# Patient Record
Sex: FEMALE | Race: WHITE | NOT HISPANIC OR LATINO | URBAN - METROPOLITAN AREA
[De-identification: names, ages, dates, MRNs, and addresses within clinical notes are randomized per-mention and may not be internally consistent; named-entity substitution may affect disease eponyms.]

---

## 2020-03-12 ENCOUNTER — EMERGENCY (EMERGENCY)
Facility: HOSPITAL | Age: 30
LOS: 1 days | Discharge: HOME | End: 2020-03-12
Attending: EMERGENCY MEDICINE
Payer: COMMERCIAL

## 2020-03-12 VITALS
OXYGEN SATURATION: 100 % | RESPIRATION RATE: 18 BRPM | DIASTOLIC BLOOD PRESSURE: 75 MMHG | SYSTOLIC BLOOD PRESSURE: 116 MMHG | HEART RATE: 80 BPM | TEMPERATURE: 101 F

## 2020-03-12 LAB
FLUBV RNA SPEC QL NAA+PROBE: DETECTED
RAPID RVP RESULT: DETECTED

## 2020-03-12 PROCEDURE — 87581 M.PNEUMON DNA AMP PROBE: CPT

## 2020-03-12 PROCEDURE — 99283 EMERGENCY DEPT VISIT LOW MDM: CPT

## 2020-03-12 PROCEDURE — 87798 DETECT AGENT NOS DNA AMP: CPT

## 2020-03-12 PROCEDURE — U0001: CPT

## 2020-03-12 PROCEDURE — 87486 CHLMYD PNEUM DNA AMP PROBE: CPT

## 2020-03-12 PROCEDURE — 87633 RESP VIRUS 12-25 TARGETS: CPT

## 2020-03-12 RX ORDER — ACETAMINOPHEN 500 MG
975 TABLET ORAL ONCE
Refills: 0 | Status: COMPLETED | OUTPATIENT
Start: 2020-03-12 | End: 2020-03-12

## 2020-03-12 RX ADMIN — Medication 975 MILLIGRAM(S): at 14:54

## 2020-03-12 NOTE — ED PROVIDER NOTE - OBJECTIVE STATEMENT
29y f pmhx craniofacial dysplasia p/w 3 days of fever and cough. Pt and her  returned from [place] tuesday evening when her symptoms began. She reports tmax 103F PO 2 days ago and a nonproductive cough. She also admits to sore throat and runny nose. her  has the same symptoms. She has not taken anything OTC for relief. came to the ED concerned about COVID19. Denies known contacts with people diagnosed with COVID19 but admits people were coughing around her. She did get her flu shot year year. 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 Denies CP, SOB, HA, neck pain, abd pain, n/v/d/c, abd pain, LOC, dizziness, or urinary symptoms. 29y f pmhx craniofacial dysplasia p/w 3 days of fever and cough. Pt and her  returned from [place] tuesday evening when her symptoms began. She reports tmax 103F PO 2 days ago and a nonproductive cough. She also admits to sore throat and runny nose. her  has the same symptoms. She has not taken anything OTC for relief. came to the ED concerned about COVID19. Denies known contacts with people diagnosed with COVID19 but admits people were coughing around her. She did get her flu shot year year. 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 Denies CP, SOB, HA, neck pain, abd pain, n/v/d/c, abd pain, LOC, dizziness, or urinary symptoms.      Attending note. Patient was seen in fast track room #4. Agree with the above. Patient recently traveled to [place] and developed fever subsequently. She has a mild cough. She denies any chest discomfort or dyspnea on exertion, or shortness of breath.

## 2020-03-12 NOTE — ED PROVIDER NOTE - PATIENT PORTAL LINK FT
You can access the FollowMyHealth Patient Portal offered by [facility] by registering at the following website: [email or web address]. By joining [employer]’s FollowMyHealth portal, you will also be able to view your health information using other applications (apps) compatible with our system.

## 2020-03-12 NOTE — ED PROVIDER NOTE - PROGRESS NOTE DETAILS
pt would like to go home, will contact her when RVP results. will advise self quarantine - Varinder Tovar PA-C pt would like to go home, will contact her when RVP results. will advise her and  to self quarantine. Case documented in [employer]. Attempted to call number in COVID order, no answer. - Varinder Tovar PA-C

## 2020-03-12 NOTE — ED PROVIDER NOTE - CLINICAL SUMMARY MEDICAL DECISION MAKING FREE TEXT BOX
Attn - pt with fever and cough after traveling to [place].  Although not on CDC list of countries, concern for COVID-19.  RVP, swab for coronavirus.

## 2020-03-12 NOTE — ED PROVIDER NOTE - NSFOLLOWUPINSTRUCTIONS_ED_ALL_ED_FT
YOU HAVE BEEN TESTED FOR COVID-19. YOU MUST REMAIN ISOLATED AT HOME FOR 14 DAYS MINIMUM UNTIL TOLD OTHERWISE TO PREVENT FURTHER SPREAD OF THIS DISEASE. YOU WILL RECEIVE A CALL FOR BOTH NEGATIVE AND POSITIVE RESULTS. PLEASE READ ATTACHED MATERIAL.    For fever/body aches:  Take Motrin 400-600mg every 6 hrs as needed for pain. Take with food  Take Tylenol 650mg every 6 hrs as needed for pain.    For cough:  Mucinex 600mg twice daily with lots of water    Stay well hydrated with water and electrolyte replacement solutions such as Pedialyte or the adult equivalent.     Return to the ED for worsening symptoms, shortness of breath, chest pain, inability to tolerate food/drink, loss of consciousness, or any other serious concerns.

## 2020-03-12 NOTE — ED ADULT NURSE NOTE - OBJECTIVE STATEMENT
29y female with no pmh presents to the ER c/o fever after recent travel to [place]. pt is alert and oriented x 4 and speaking coherently. pt states she returned from [place] on Tuesday and has had symptoms including fever tmax 103, diarrhea and non productive cough. pt states her  has similar symptoms, but his symptoms are improving. Pt denies cp, sob. pt in nad. pt ambulatory, gait steady. will reassess.

## 2020-03-12 NOTE — ED PROVIDER NOTE - PHYSICAL EXAMINATION
Attending note. Patient is alert and in no acute distress. Pharynx is slightly erythematous. There is no tonsillar exudates. There is no cervical adenopathy. Lungs are clear and equal bilaterally. Heart is regular rate and rhythm without murmur. Abdomen is nontender. There is no CVA tenderness. Skin is warm and dry.

## 2020-03-13 NOTE — ED POST DISCHARGE NOTE - DETAILS
3/13/20: Pt contacted, made aware of influenza B result and advised on supportive measures. Informed her COVID-19 testing not back yet and while co-infection would be unlikely, still recommended she stay on self-quarantine as advised until that results. Pt acknowledged understanding. - Shashank Duran PA-C 3/15/2020 @ 0826, patient called requesting covid results, informed patient that it is negative.

## 2020-03-14 LAB — SARS-COV-2 RNA SPEC QL NAA+PROBE: SIGNIFICANT CHANGE UP

## 2023-02-28 PROBLEM — Z78.9 OTHER SPECIFIED HEALTH STATUS: Chronic | Status: ACTIVE | Noted: 2020-03-12

## 2023-03-10 ENCOUNTER — APPOINTMENT (OUTPATIENT)
Dept: ORTHOPEDIC SURGERY | Facility: CLINIC | Age: 33
End: 2023-03-10
Payer: COMMERCIAL

## 2023-03-10 VITALS — HEIGHT: 58 IN

## 2023-03-10 DIAGNOSIS — M62.830 MUSCLE SPASM OF BACK: ICD-10-CM

## 2023-03-10 DIAGNOSIS — M41.25 OTHER IDIOPATHIC SCOLIOSIS, THORACOLUMBAR REGION: ICD-10-CM

## 2023-03-10 DIAGNOSIS — Z78.9 OTHER SPECIFIED HEALTH STATUS: ICD-10-CM

## 2023-03-10 PROBLEM — Z00.00 ENCOUNTER FOR PREVENTIVE HEALTH EXAMINATION: Status: ACTIVE | Noted: 2023-03-10

## 2023-03-10 PROCEDURE — 72082 X-RAY EXAM ENTIRE SPI 2/3 VW: CPT

## 2023-03-10 PROCEDURE — 99204 OFFICE O/P NEW MOD 45 MIN: CPT

## 2023-03-10 NOTE — ASSESSMENT
[FreeTextEntry1] : Patient will begin physical therapy. \par \par Recommend: - NSAID - Heating pad - Muscle relaxer - Core strengthening exercise - Hamstring stretching exercise Patient is given back rehabilitation exercise book. \par \par Patient given referral to medical massage for evaluation and treatment. \par \par Follow up in 2 months

## 2023-03-10 NOTE — HISTORY OF PRESENT ILLNESS
[1] : 2 [3] : 3 [Dull/Aching] : dull/aching [Intermittent] : intermittent [Massage] : massage [Full time] : Work status: full time [de-identified] : Patient presents today with scoliosis evaluation after pain and having 2 babies. Denies previous treatments. States she has pain throughout her entire back, more so at the top. Admits to taking Tylenol PRN. Denies recent imaging.\par \par States she has been having rib pain, had imaging at , and advised it could be her scoliosis. 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

## 2023-05-12 ENCOUNTER — APPOINTMENT (OUTPATIENT)
Dept: ORTHOPEDIC SURGERY | Facility: CLINIC | Age: 33
End: 2023-05-12
